# Patient Record
Sex: FEMALE | URBAN - METROPOLITAN AREA
[De-identification: names, ages, dates, MRNs, and addresses within clinical notes are randomized per-mention and may not be internally consistent; named-entity substitution may affect disease eponyms.]

---

## 2017-08-19 ENCOUNTER — IMPORTED ENCOUNTER (OUTPATIENT)
Dept: URBAN - METROPOLITAN AREA CLINIC 1 | Facility: CLINIC | Age: 82
End: 2017-08-19

## 2017-08-19 PROBLEM — Z96.1: Noted: 2017-08-19

## 2017-08-19 PROBLEM — H16.143: Noted: 2017-08-19

## 2017-08-19 PROBLEM — H35.3134: Noted: 2017-08-19

## 2017-08-19 PROBLEM — H35.033: Noted: 2017-08-19

## 2017-08-19 PROBLEM — H04.123: Noted: 2017-08-19

## 2017-08-19 PROCEDURE — 92015 DETERMINE REFRACTIVE STATE: CPT

## 2017-08-19 PROCEDURE — 92014 COMPRE OPH EXAM EST PT 1/>: CPT

## 2017-08-19 NOTE — PATIENT DISCUSSION
1.  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 Importance of daily AREDS II study multivitamin and Amsler Grid checks discussed with patient. Patient to follow-up immediately with any new onset of decreased vision and/or metamorphopsia. 2. VIOLETTE w/ PEK OU- Begin ATs BID OU Routinely. 3.  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 Pseudophakia OU - H/o YAG Cap OU Letter to PCP Return for an appointment in 1 yr 27 with Dr. Kamille Velasquez.

## 2017-10-18 NOTE — PATIENT DISCUSSION
Treat with Restasis twice a day OU.  Explained that this is a long-term medication with results taking months, and that some initial burning is normal.

## 2018-08-20 ENCOUNTER — IMPORTED ENCOUNTER (OUTPATIENT)
Dept: URBAN - METROPOLITAN AREA CLINIC 1 | Facility: CLINIC | Age: 83
End: 2018-08-20

## 2018-08-20 PROBLEM — H04.123: Noted: 2018-08-20

## 2018-08-20 PROBLEM — H35.033: Noted: 2018-08-20

## 2018-08-20 PROBLEM — Z96.1: Noted: 2018-08-20

## 2018-08-20 PROBLEM — H35.3134: Noted: 2018-08-20

## 2018-08-20 PROBLEM — H16.143: Noted: 2018-08-20

## 2018-08-20 PROCEDURE — 92014 COMPRE OPH EXAM EST PT 1/>: CPT

## 2018-08-20 NOTE — PATIENT DISCUSSION
1.  ARMD OU with Geographic Atrophy OU advanced with subfoveal involvement/dry/stable. Importance of daily AREDS II study multivitamin and Amsler Grid checks discussed with patient. Patient to follow-up immediately with any new onset of decreased vision and/or metamorphopsia. 2. VIOLETTE w/ PEK OU- Recommend ATs TID OU routinely 3. GR II Hypertensive Retinopathy OU-Stable continue HTN Control4. Pseudophakia OU - h/o YAG Cap OU Patient defers refraction at today's visitReturn for an appointment in 1 year 27 with Dr. Izabela Fulton.

## 2022-04-02 ASSESSMENT — TONOMETRY
OS_IOP_MMHG: 17
OS_IOP_MMHG: 17
OD_IOP_MMHG: 17
OD_IOP_MMHG: 17

## 2022-04-02 ASSESSMENT — VISUAL ACUITY
OS_SC: 20/400
OD_CC: J10
OD_SC: 20/200
OD_SC: 20/400
OS_CC: J10
OS_SC: 20/150